# Patient Record
Sex: FEMALE | Race: WHITE | NOT HISPANIC OR LATINO | Employment: FULL TIME | URBAN - METROPOLITAN AREA
[De-identification: names, ages, dates, MRNs, and addresses within clinical notes are randomized per-mention and may not be internally consistent; named-entity substitution may affect disease eponyms.]

---

## 2018-07-28 ENCOUNTER — OFFICE VISIT (OUTPATIENT)
Dept: URGENT CARE | Facility: PHYSICIAN GROUP | Age: 28
End: 2018-07-28
Payer: COMMERCIAL

## 2018-07-28 VITALS
OXYGEN SATURATION: 99 % | HEIGHT: 66 IN | HEART RATE: 64 BPM | TEMPERATURE: 97.9 F | BODY MASS INDEX: 20.41 KG/M2 | RESPIRATION RATE: 16 BRPM | WEIGHT: 127 LBS | DIASTOLIC BLOOD PRESSURE: 58 MMHG | SYSTOLIC BLOOD PRESSURE: 112 MMHG

## 2018-07-28 DIAGNOSIS — T14.8XXA WOUND OF SKIN: ICD-10-CM

## 2018-07-28 PROCEDURE — 12011 RPR F/E/E/N/L/M 2.5 CM/<: CPT | Performed by: FAMILY MEDICINE

## 2018-07-28 RX ORDER — DOXYCYCLINE HYCLATE 100 MG
100 TABLET ORAL EVERY 12 HOURS
Qty: 10 TAB | Refills: 0 | Status: SHIPPED | OUTPATIENT
Start: 2018-07-28 | End: 2018-08-02

## 2018-07-28 ASSESSMENT — ENCOUNTER SYMPTOMS
FOCAL WEAKNESS: 0
HEADACHES: 0
LOSS OF CONSCIOUSNESS: 0
DIZZINESS: 0
FEVER: 0

## 2018-07-28 NOTE — PROGRESS NOTES
"Subjective:      Sarah Ortiz is a 28 y.o. female who presents with Facial Laceration (Lt temple laceration x1day )    Chief Complaint   Patient presents with   • Facial Laceration     Lt temple laceration x1day         - This is a very pleasant 28 y.o. female with complaints of cut Lt temple 1hr ago. Wake boarding and fell and board hit Lt side head. No LOC          ALLERGIES:  Patient has no known allergies.     PMH:  History reviewed. No pertinent past medical history.     MEDS:    Current Outpatient Prescriptions:   •  doxycycline (VIBRAMYCIN) 100 MG Tab, Take 1 Tab by mouth every 12 hours for 5 days., Disp: 10 Tab, Rfl: 0    ** I have documented what I find to be significant in regards to past medical, social, family and surgical history  in my HPI or under PMH/PSH/FH review section, otherwise it is contributory **           HPI    Review of Systems   Constitutional: Negative for fever.   Neurological: Negative for dizziness, focal weakness, loss of consciousness and headaches.   All other systems reviewed and are negative.         Objective:     /58   Pulse 64   Temp 36.6 °C (97.9 °F)   Resp 16   Ht 1.676 m (5' 6\")   Wt 57.6 kg (127 lb)   SpO2 99%   Breastfeeding? No   BMI 20.50 kg/m²      Physical Exam   Constitutional: She appears well-developed. No distress.   HENT:   Head: Normocephalic.   Pulmonary/Chest: Effort normal. No respiratory distress.   Neurological: She is alert. She exhibits normal muscle tone.   Skin: Skin is warm.   Psychiatric: She has a normal mood and affect.   Nursing note and vitals reviewed.              Assessment/Plan:         1. Wound of skin  doxycycline (VIBRAMYCIN) 100 MG Tab             Procedure: Laceration Repair    -Sterile/clean technique throughout  -Local anesthesia with 2% lidocaine  -Closed with 5#  6-0 Nylon interrupted sutures with good wound approximation. Care was taken not to disturb/injure underlying bones, joint, joint-space, tendons, nerves " and major vessels   -Polysporin and dressing placed  -Patient tolerated well  - wound care/instructions discussed  - 2 day wound check advised